# Patient Record
Sex: FEMALE | Race: BLACK OR AFRICAN AMERICAN | Employment: OTHER | URBAN - METROPOLITAN AREA
[De-identification: names, ages, dates, MRNs, and addresses within clinical notes are randomized per-mention and may not be internally consistent; named-entity substitution may affect disease eponyms.]

---

## 2017-01-20 ENCOUNTER — HOSPITAL ENCOUNTER (EMERGENCY)
Age: 82
Discharge: HOME | End: 2017-01-20
Attending: EMERGENCY MEDICINE
Payer: MEDICARE

## 2017-01-20 ENCOUNTER — APPOINTMENT (OUTPATIENT)
Dept: GENERAL RADIOLOGY | Age: 82
End: 2017-01-20
Attending: EMERGENCY MEDICINE
Payer: MEDICARE

## 2017-01-20 VITALS
HEART RATE: 82 BPM | TEMPERATURE: 97.8 F | SYSTOLIC BLOOD PRESSURE: 156 MMHG | BODY MASS INDEX: 21.53 KG/M2 | OXYGEN SATURATION: 100 % | HEIGHT: 62 IN | WEIGHT: 117 LBS | RESPIRATION RATE: 24 BRPM | DIASTOLIC BLOOD PRESSURE: 96 MMHG

## 2017-01-20 DIAGNOSIS — R06.00 DYSPNEA, UNSPECIFIED TYPE: Primary | ICD-10-CM

## 2017-01-20 DIAGNOSIS — E87.6 HYPOKALEMIA: ICD-10-CM

## 2017-01-20 LAB
ALBUMIN SERPL BCP-MCNC: 3.5 G/DL (ref 3.4–5)
ALBUMIN/GLOB SERPL: 0.8 {RATIO} (ref 0.8–1.7)
ALP SERPL-CCNC: 67 U/L (ref 45–117)
ALT SERPL-CCNC: 22 U/L (ref 13–56)
ANION GAP BLD CALC-SCNC: 5 MMOL/L (ref 3–18)
APPEARANCE UR: CLEAR
AST SERPL W P-5'-P-CCNC: 26 U/L (ref 15–37)
ATRIAL RATE: 97 BPM
BACTERIA URNS QL MICRO: ABNORMAL /HPF
BASOPHILS # BLD AUTO: 0 K/UL (ref 0–0.06)
BASOPHILS # BLD: 0 % (ref 0–2)
BILIRUB SERPL-MCNC: 1.5 MG/DL (ref 0.2–1)
BILIRUB UR QL: NEGATIVE
BNP SERPL-MCNC: 453 PG/ML (ref 0–1800)
BUN SERPL-MCNC: 13 MG/DL (ref 7–18)
BUN/CREAT SERPL: 14 (ref 12–20)
CALCIUM SERPL-MCNC: 8.8 MG/DL (ref 8.5–10.1)
CALCULATED P AXIS, ECG09: 80 DEGREES
CALCULATED R AXIS, ECG10: 54 DEGREES
CALCULATED T AXIS, ECG11: 12 DEGREES
CHLORIDE SERPL-SCNC: 98 MMOL/L (ref 100–108)
CK MB CFR SERPL CALC: 1.6 % (ref 0–4)
CK MB SERPL-MCNC: 2.6 NG/ML (ref 0.5–3.6)
CK SERPL-CCNC: 161 U/L (ref 26–192)
CO2 SERPL-SCNC: 32 MMOL/L (ref 21–32)
COLOR UR: YELLOW
CREAT SERPL-MCNC: 0.93 MG/DL (ref 0.6–1.3)
DIAGNOSIS, 93000: NORMAL
DIFFERENTIAL METHOD BLD: ABNORMAL
EOSINOPHIL # BLD: 0 K/UL (ref 0–0.4)
EOSINOPHIL NFR BLD: 0 % (ref 0–5)
EPITH CASTS URNS QL MICRO: ABNORMAL /LPF (ref 0–5)
ERYTHROCYTE [DISTWIDTH] IN BLOOD BY AUTOMATED COUNT: 13.9 % (ref 11.6–14.5)
GLOBULIN SER CALC-MCNC: 4.2 G/DL (ref 2–4)
GLUCOSE SERPL-MCNC: 84 MG/DL (ref 74–99)
GLUCOSE UR STRIP.AUTO-MCNC: NEGATIVE MG/DL
HCT VFR BLD AUTO: 41.7 % (ref 35–45)
HGB BLD-MCNC: 14.2 G/DL (ref 12–16)
HGB UR QL STRIP: ABNORMAL
KETONES UR QL STRIP.AUTO: NEGATIVE MG/DL
LEUKOCYTE ESTERASE UR QL STRIP.AUTO: NEGATIVE
LYMPHOCYTES # BLD AUTO: 28 % (ref 21–52)
LYMPHOCYTES # BLD: 1.3 K/UL (ref 0.9–3.6)
MCH RBC QN AUTO: 28.3 PG (ref 24–34)
MCHC RBC AUTO-ENTMCNC: 34.1 G/DL (ref 31–37)
MCV RBC AUTO: 83.2 FL (ref 74–97)
MONOCYTES # BLD: 0.5 K/UL (ref 0.05–1.2)
MONOCYTES NFR BLD AUTO: 12 % (ref 3–10)
NEUTS SEG # BLD: 2.7 K/UL (ref 1.8–8)
NEUTS SEG NFR BLD AUTO: 60 % (ref 40–73)
NITRITE UR QL STRIP.AUTO: NEGATIVE
P-R INTERVAL, ECG05: 204 MS
PH UR STRIP: 7.5 [PH] (ref 5–8)
PLATELET # BLD AUTO: 303 K/UL (ref 135–420)
PMV BLD AUTO: 8.4 FL (ref 9.2–11.8)
POTASSIUM SERPL-SCNC: 3.3 MMOL/L (ref 3.5–5.5)
PROT SERPL-MCNC: 7.7 G/DL (ref 6.4–8.2)
PROT UR STRIP-MCNC: NEGATIVE MG/DL
Q-T INTERVAL, ECG07: 358 MS
QRS DURATION, ECG06: 72 MS
QTC CALCULATION (BEZET), ECG08: 454 MS
RBC # BLD AUTO: 5.01 M/UL (ref 4.2–5.3)
RBC #/AREA URNS HPF: ABNORMAL /HPF (ref 0–5)
SODIUM SERPL-SCNC: 135 MMOL/L (ref 136–145)
SP GR UR REFRACTOMETRY: 1.01 (ref 1–1.03)
TROPONIN I SERPL-MCNC: <0.02 NG/ML (ref 0–0.06)
UROBILINOGEN UR QL STRIP.AUTO: 1 EU/DL (ref 0.2–1)
VENTRICULAR RATE, ECG03: 97 BPM
WBC # BLD AUTO: 4.6 K/UL (ref 4.6–13.2)
WBC URNS QL MICRO: ABNORMAL /HPF (ref 0–5)

## 2017-01-20 PROCEDURE — 80053 COMPREHEN METABOLIC PANEL: CPT | Performed by: EMERGENCY MEDICINE

## 2017-01-20 PROCEDURE — 71010 XR CHEST PORT: CPT

## 2017-01-20 PROCEDURE — 81001 URINALYSIS AUTO W/SCOPE: CPT | Performed by: EMERGENCY MEDICINE

## 2017-01-20 PROCEDURE — 82550 ASSAY OF CK (CPK): CPT | Performed by: EMERGENCY MEDICINE

## 2017-01-20 PROCEDURE — 83880 ASSAY OF NATRIURETIC PEPTIDE: CPT | Performed by: EMERGENCY MEDICINE

## 2017-01-20 PROCEDURE — 74011250637 HC RX REV CODE- 250/637: Performed by: EMERGENCY MEDICINE

## 2017-01-20 PROCEDURE — 85025 COMPLETE CBC W/AUTO DIFF WBC: CPT | Performed by: EMERGENCY MEDICINE

## 2017-01-20 PROCEDURE — 93005 ELECTROCARDIOGRAM TRACING: CPT

## 2017-01-20 PROCEDURE — 99284 EMERGENCY DEPT VISIT MOD MDM: CPT

## 2017-01-20 RX ORDER — POTASSIUM CHLORIDE 20 MEQ/1
40 TABLET, EXTENDED RELEASE ORAL
Status: COMPLETED | OUTPATIENT
Start: 2017-01-20 | End: 2017-01-20

## 2017-01-20 RX ORDER — FUROSEMIDE 20 MG/1
20 TABLET ORAL DAILY
COMMUNITY

## 2017-01-20 RX ORDER — ASPIRIN 325 MG
50000 TABLET, DELAYED RELEASE (ENTERIC COATED) ORAL
COMMUNITY

## 2017-01-20 RX ADMIN — POTASSIUM CHLORIDE 40 MEQ: 20 TABLET, EXTENDED RELEASE ORAL at 16:13

## 2017-01-20 NOTE — ED TRIAGE NOTES
Per sister she had picked her up to take her to the pharmacy and after she said she was taking her home pt began to c/o feeling SOB. Sister thinks it is anxiety b/c she does not like to be alone b/c she gets lonely. Pt hyperventilating in triage and encouraged numerous times to slow her breathing down. Pt then c/o left arm pain so an EKG was done and given to Dr Cristiana Hernandez. Sepsis Screening completed    (  )Patient meets SIRS criteria. (  )Patient does not meet SIRS criteria.       SIRS Criteria is achieved when two or more of the following are present   Temperature < 96.8°F (36°C) or > 100.9°F (38.3°C)   Heart Rate > 90 beats per minute   Respiratory Rate > 20 beats per minute   WBC count > 12,000 or <4,000 or > 10% bands

## 2017-01-20 NOTE — ED NOTES
I have reviewed discharge instructions with the patient. The patient verbalized understanding. Pt left ED in stable condition in wheel chair into lobby with family driving pt home.  Iv discontinued prior to pt leaving ED with cath tip intact

## 2017-01-20 NOTE — ED NOTES
Pt back in room in stable condition with no distress noted and no complaints voiced, pt being prepared for discharge home at this time

## 2017-01-20 NOTE — ED PROVIDER NOTES
HPI Comments:   1:56 PM    José Miguel Oneal is a 80 y.o. female with a PMHx of psychiatric disorder presenting to the ED C/O intermittent SOB and chest pain onset today. Associated sx include wheezing. Pt also reports she doesn't eat a lot in general. Family member reports pt constantly keeps saying, \"I need air. \" Pt reports her shortness of breath is a little better. Pt reports she feels as if \"there is some distance between her and her brain. \" While in triage, pt started hyperventilating and having arm pain, so an EKG was done. Pt reports that cool air makes her feel better. Pt saw her regular doctor 2 days ago, Dr. Shanice Winchester, for irregular breathing, and he said she was fine. Pt has a PMHx of arthritis and HTN. Pt is a non smoker and non EtOH user. Pt denies cough, fever, and any other associated signs and sx. Patient is a 80 y.o. female presenting with shortness of breath. The history is provided by the patient. No  was used. Shortness of Breath   This is a new problem. The problem occurs continuously. The problem has been resolved. Associated symptoms include wheezing and chest pain. Pertinent negatives include no fever, no headaches, no rhinorrhea, no sore throat, no cough, no vomiting, no abdominal pain and no leg swelling. Past Medical History:   Diagnosis Date    Arthritis     Hypertension     Psychiatric disorder        Past Surgical History:   Procedure Laterality Date    Pr neurological procedure unlisted           History reviewed. No pertinent family history. Social History     Social History    Marital status:      Spouse name: N/A    Number of children: N/A    Years of education: N/A     Occupational History    Not on file.      Social History Main Topics    Smoking status: Never Smoker    Smokeless tobacco: Not on file    Alcohol use No    Drug use: No    Sexual activity: Not on file     Other Topics Concern    Not on file     Social History Narrative         ALLERGIES: Lamisil [terbinafine] and Pcn [penicillins]    Review of Systems   Constitutional: Negative for appetite change, chills and fever. HENT: Negative for congestion, rhinorrhea and sore throat. Eyes: Negative for pain, discharge and visual disturbance. Respiratory: Positive for shortness of breath and wheezing. Negative for cough and chest tightness. Cardiovascular: Positive for chest pain. Negative for leg swelling. Gastrointestinal: Negative for abdominal pain, diarrhea, nausea and vomiting. Endocrine: Negative for polydipsia and polyuria. Genitourinary: Negative for difficulty urinating, dysuria, frequency, hematuria, menstrual problem, pelvic pain, urgency, vaginal bleeding and vaginal discharge. Musculoskeletal: Negative for arthralgias, back pain and myalgias. (+) Arm Pain   Skin: Negative for color change. Neurological: Negative for weakness, numbness and headaches. Hematological: Does not bruise/bleed easily. Psychiatric/Behavioral: Negative for decreased concentration. All other systems reviewed and are negative. Vitals:    01/20/17 1321   BP: 141/78   Pulse: 98   Resp: 24   Temp: 97.3 °F (36.3 °C)   SpO2: 100%   Weight: 53.1 kg (117 lb)   Height: 5' 2\" (1.575 m)            Physical Exam   Nursing note and vitals reviewed. -------------------------PHYSICAL EXAM-------------------------    Vital signs and nursing notes reviewed  Nursing note and VS were reviewed      CONSTITUTIONAL: Chronically ill appearing. Alert and oriented. No distress. Not diaphoretic. Afebrile. HEAD: Normocephalic, Atraumatic. EYES: Pupils are equal, round and reactive. Extra-ocular movements intact. Sclera anicteric. Conjunctiva not injected. ENT: Mucous membranes are moist. There is no erythema or swelling of the mucosal tissues or enlargement of the tonsillar tissue or the presence of exudates. No oral lesions or thrush. The left TM is unremarkable.  Both EAC's are without swelling or erythema. Both TM's unremarkable. Nasal mucosa pink with no discharge and the turbinates are of normal size. NECK: Normal ROM. Neck is supple. No posterior cervical paraspinal or midline tenderness. No obvious enlargement of the thyroid. No significant anterior cervical adenopathy. Trachea is midline. CARDIOVASCULAR:  Grade II ROXANA, which is her baseline. Distal pulses are 2+ and equal.    PULMONARY: Respiratory effort is normal and without the use of accessory muscles. Patient is speaking in full sentences. Clear to auscultation bilaterally. No wheezing, rales or rhonchi. CHEST WALL: Normal shape;  non tender to palpation; no crepitus    ABDOMINAL: Soft and non-distended. No tenderness. NO peritoneal signs - No rebound, guarding or rigidity. Active bowel sounds present. BACK: No thoracolumbar midline or paraspinal tenderness. Full range of motion. No CVA tenderness. MUSCULOSKELETAL: No obvious soft tissue tenderness or sites of bony tenderness or deformities; Full range of motion in all extremities. No obvious muscle tenderness. No joint inflammation. 1+ edema noted. SKIN: Skin is warm and dry. Good skin turgor. No diaphoresis, lesions,  Rashes, or petechiae. Cap Refill is Normal.    NEUROLOGICAL: Alert, awake and appropriately oriented. Normal speech. CN's are normal;  Motor - no focal weakness; no obvious sensory loss; Cerebellar function- intact; DTR's - 2+ equal    PSYCH: Appropriate affect; normal thought content; no expressed suicidal ideation. MDM  Number of Diagnoses or Management Options  Diagnosis management comments: INITIAL CLINICAL IMPRESSION and PLANS:  The patient presents with the primary complaint(s) of: sensation of SOB. The presentation, to include historical aspects and clinical findings appear to be consistent with the DX of CHF. However, other possible DX's to consider as primary, associated with, or exacerbated by include:    1. Anxiety  2. ACS  3. PNA    Considering the above, my initial management plan to evaluate and therapeutic interventions include: Obtain Lab Studies, and Obtain Radiologic studies,  As well as those noted in the orders:    Albaro Giron MD       Amount and/or Complexity of Data Reviewed  Clinical lab tests: reviewed and ordered  Tests in the radiology section of CPT®: ordered and reviewed (CXR)  Tests in the medicine section of CPT®: reviewed and ordered (EKG)  Independent visualization of images, tracings, or specimens: yes (CXR, EKG)      ED Course     Medications   potassium chloride SR (KLOR-CON 10) tablet 40 mEq (not administered)        Procedures    EKG interpretation: (Preliminary)  2:27 PM   Sinus rhythm with marked sinus arrhythmia at 97 bpm. Anterior infarct, age undetermined. Abnormal EKG. EKG read by Paul Chery MD       ED CLINICAL SUMMARY - DISCHARGE     4:00 PM   CLINICAL COURSE while in the ED:      Intervention)s) while in ED:  ACTIONS / APPROACH: Based on the presenting SUBACUTE history of shortness of breath. My initial focus was to Determine the cause and extent of the problem, Initiate Treatment as Appropriate and STABILIZE THE PATIENT'S CONDITION . Details of actions taken are noted below. SPECIFICS REGARDING APPROACH:     1. DIAGNOSTIC RESULTS:   XR CHEST PORT   Final Result   Impression:     1.  No evidence for acute cardiopulmonary process.       As read by the radiologist.         Labs Reviewed   CBC WITH AUTOMATED DIFF - Abnormal; Notable for the following:        Result Value    MPV 8.4 (*)     MONOCYTES 12 (*)     All other components within normal limits   METABOLIC PANEL, COMPREHENSIVE - Abnormal; Notable for the following:     Sodium 135 (*)     Potassium 3.3 (*)     Chloride 98 (*)     GFR est non-AA 57 (*)     Bilirubin, total 1.5 (*)     Globulin 4.2 (*)     All other components within normal limits   URINALYSIS W/ RFLX MICROSCOPIC - Abnormal; Notable for the following:     Blood TRACE (*)     All other components within normal limits   URINE MICROSCOPIC ONLY - Abnormal; Notable for the following:     Bacteria FEW (*)     All other components within normal limits   CARDIAC PANEL,(CK, CKMB & TROPONIN)   PRO-BNP           Recent Results (from the past 12 hour(s))   EKG, 12 LEAD, INITIAL    Collection Time: 01/20/17  1:27 PM   Result Value Ref Range    Ventricular Rate 97 BPM    Atrial Rate 97 BPM    P-R Interval 204 ms    QRS Duration 72 ms    Q-T Interval 358 ms    QTC Calculation (Bezet) 454 ms    Calculated P Axis 80 degrees    Calculated R Axis 54 degrees    Calculated T Axis 12 degrees    Diagnosis       Sinus rhythm with marked sinus arrhythmia  Anterior infarct (cited on or before 20-SEP-2016)  Abnormal ECG  When compared with ECG of 18-DEC-2016 13:57,  Non-specific change in ST segment in Anterior leads  Nonspecific T wave abnormality now evident in Inferior leads     URINALYSIS W/ RFLX MICROSCOPIC    Collection Time: 01/20/17  1:45 PM   Result Value Ref Range    Color YELLOW      Appearance CLEAR      Specific gravity 1.013 1.005 - 1.030      pH (UA) 7.5 5.0 - 8.0      Protein NEGATIVE  NEG mg/dL    Glucose NEGATIVE  NEG mg/dL    Ketone NEGATIVE  NEG mg/dL    Bilirubin NEGATIVE  NEG      Blood TRACE (A) NEG      Urobilinogen 1.0 0.2 - 1.0 EU/dL    Nitrites NEGATIVE  NEG      Leukocyte Esterase NEGATIVE  NEG     URINE MICROSCOPIC ONLY    Collection Time: 01/20/17  1:45 PM   Result Value Ref Range    WBC 4 to 10 0 - 5 /hpf    RBC 0 to 5 0 - 5 /hpf    Epithelial cells 1+ 0 - 5 /lpf    Bacteria FEW (A) NEG /hpf   CBC WITH AUTOMATED DIFF    Collection Time: 01/20/17  2:50 PM   Result Value Ref Range    WBC 4.6 4.6 - 13.2 K/uL    RBC 5.01 4.20 - 5.30 M/uL    HGB 14.2 12.0 - 16.0 g/dL    HCT 41.7 35.0 - 45.0 %    MCV 83.2 74.0 - 97.0 FL    MCH 28.3 24.0 - 34.0 PG    MCHC 34.1 31.0 - 37.0 g/dL    RDW 13.9 11.6 - 14.5 %    PLATELET 515 520 - 009 K/uL    MPV 8.4 (L) 9.2 - 11.8 FL    NEUTROPHILS 60 40 - 73 %    LYMPHOCYTES 28 21 - 52 %    MONOCYTES 12 (H) 3 - 10 %    EOSINOPHILS 0 0 - 5 %    BASOPHILS 0 0 - 2 %    ABS. NEUTROPHILS 2.7 1.8 - 8.0 K/UL    ABS. LYMPHOCYTES 1.3 0.9 - 3.6 K/UL    ABS. MONOCYTES 0.5 0.05 - 1.2 K/UL    ABS. EOSINOPHILS 0.0 0.0 - 0.4 K/UL    ABS. BASOPHILS 0.0 0.0 - 0.06 K/UL    DF AUTOMATED     METABOLIC PANEL, COMPREHENSIVE    Collection Time: 01/20/17  2:50 PM   Result Value Ref Range    Sodium 135 (L) 136 - 145 mmol/L    Potassium 3.3 (L) 3.5 - 5.5 mmol/L    Chloride 98 (L) 100 - 108 mmol/L    CO2 32 21 - 32 mmol/L    Anion gap 5 3.0 - 18 mmol/L    Glucose 84 74 - 99 mg/dL    BUN 13 7.0 - 18 MG/DL    Creatinine 0.93 0.6 - 1.3 MG/DL    BUN/Creatinine ratio 14 12 - 20      GFR est AA >60 >60 ml/min/1.73m2    GFR est non-AA 57 (L) >60 ml/min/1.73m2    Calcium 8.8 8.5 - 10.1 MG/DL    Bilirubin, total 1.5 (H) 0.2 - 1.0 MG/DL    ALT 22 13 - 56 U/L    AST 26 15 - 37 U/L    Alk. phosphatase 67 45 - 117 U/L    Protein, total 7.7 6.4 - 8.2 g/dL    Albumin 3.5 3.4 - 5.0 g/dL    Globulin 4.2 (H) 2.0 - 4.0 g/dL    A-G Ratio 0.8 0.8 - 1.7     CARDIAC PANEL,(CK, CKMB & TROPONIN)    Collection Time: 01/20/17  2:50 PM   Result Value Ref Range     26 - 192 U/L    CK - MB 2.6 0.5 - 3.6 ng/ml    CK-MB Index 1.6 0.0 - 4.0 %    Troponin-I, Qt. <0.02 0.00 - 0.06 NG/ML   PRO-BNP    Collection Time: 01/20/17  2:50 PM   Result Value Ref Range    NT pro- 0 - 1800 PG/ML       2. MEDICATIONS GIVEN:   Medications   potassium chloride SR (KLOR-CON 10) tablet 40 mEq (not administered)        Response to Intervention(s):   IMPROVED       Unanticipated Developments: NONE     ED COURSE - General Comment:  During the ED course I had re-evaluated the patient, answered their and /or their family's questions regarding my clinical impression, the patient's condition and plans for therapeutic interventions.  The patient's ED course was uneventful and remained stable throughout. CLINICAL IMPRESSION AND DISCUSSION:   I reviewed our electronic medical record system for any past medical records that were available that may contribute to the patients current condition, the nursing notes and vital signs from today's visit. Based on the clinical presentation, findings and results of diagnostic studies, as well as developments while in the ED,  I suspect the following: For the presentation noted above    The most likely cause or diagnosis is: dyspnea, unspecified, and hypokalemia. DISCUSSION REGARDING DIAGNOSIS: The specifics regarding my clinical impression / diagnosis are as follows: At this time there is no clinical evidence to support other pertinent diagnostic considerations such as:   N/A    Finally, other diagnostic considerations during this visit are noted below in Clinical Impression. Specific Conversations:  NONE      DISPOSITION DECISION:     DISCHARGE: I feel that we have optimized outpatient assessment and management such that Jean Claude Mejía is stable to be discharged and to continue with her care or complete any additional evaluation as appropriate at home or as an outpatient. Preparations will be made to discharge the patient. Present condition at the time of disposition: STABLE    DISCUSSION REGARDING THE DISPOSITION:         DISCHARGE NOTE:    Chalreen Mahmood  results have been reviewed with her. She has been counseled regarding her diagnosis, treatment, and plan. She verbally conveys understanding and agreement of the signs, symptoms, diagnosis, treatment and prognosis and additionally agrees to follow up as discussed. She also agrees with the care-plan and conveys that all of her questions have been answered.   I have also provided discharge instructions for her that include: educational information regarding their diagnosis and treatment, and list of reasons why they would want to return to the ED prior to their follow-up appointment, should her condition change. The patient and/or family has been provided with education for proper Emergency Department utilization. CLINICAL IMPRESSION  1. Dyspnea, unspecified type    2. Hypokalemia        PLAN:  1. D/C home  2. Patient's Medications   Start Taking    No medications on file   Continue Taking    AMLODIPINE (NORVASC) 10 MG TABLET    Take  by mouth daily. ASPIRIN (ASPIRIN) 325 MG TABLET    Take 325 mg by mouth daily. CHOLECALCIFEROL, VITAMIN D3, 50,000 UNIT CAP    Take 50,000 Units by mouth every seven (7) days. DONEPEZIL HCL (ARICEPT PO)    Take 5 mg by mouth nightly. FOLIC ACID 492 MCG TABLET    Take 400 mcg by mouth daily. FUROSEMIDE (LASIX) 20 MG TABLET    Take 20 mg by mouth daily. HYDROCHLOROTHIAZIDE (MICROZIDE) 12.5 MG CAPSULE    Take 12.5 mg by mouth daily. METHOTREXATE (RHEUMATREX) 2.5 MG TABLET    Take 2.5 mg by mouth. These Medications have changed    No medications on file   Stop Taking    MIRABEGRON ER (MYRBETRIQ) 25 MG ER TABLET    Take 25 mg by mouth daily. 3.   Follow-up Information     Follow up With Details Comments Contact Info    Cassandra Ward MD Schedule an appointment as soon as possible for a visit in 2 days For Primary Care Follow Up [address]      [facility] EMERGENCY DEPT Go to If symptoms worsen, As needed [address] [place] [id number]  [phone]        Return if sxs worsen    ATTESTATIONS:  This note is prepared by Daisy Hester, acting as Scribe for Christina Ornelas MD.    Christina Ornelas MD: The scribe's documentation has been prepared under my direction and personally reviewed by me in its entirety. I confirm that the note above accurately reflects all work, treatment, procedures, and medical decision making performed by me.

## 2017-01-20 NOTE — DISCHARGE INSTRUCTIONS

## 2017-01-20 NOTE — Clinical Note
SPECIAL DISCHARGE INSTRUCTIONS AND COMMENTS: 
 
General comments: Thank you for allowing us to provide you with excellent care today. We hope we addressed all of your concerns and needs. We strive to provide excellent quality care in the Emergency Depart ment. If you feel that you have not received excellent quality care or timely care, please ask to speak to the nurse manager. Please choose us in the future for your continued health care needs. Follow-up comments: The exam and treatment you rece ived in the Emergency Department were for an urgent problem that may be new for you and / or one which may be related to a worsening of a chronic or ongoing medical problem that you already had prior to this visit. In any case, today's treatment is not i ntended to be considered as complete care in all cases and thus, it is important that you follow-up with a doctor, nurse practitioner, or physicians assistant to: (1) confirm your diagnosis, (2) re-evaluation of changes in your illness and treatment, an d (3) for ongoing care. In some cases we may have contacted your doctor or a specific specialist who may be involved in your future evaluation and care but in any case, take this sheet with you when you go to your follow-up visit or refer to the infor brenda on these sheets when you are calling to arrange an appointment - it may prove helpful in making the appointment. Prescribed Medications: Unless you have been directed by the provider to change your current medicines, you should continue to poonam e them as before. If you have been prescribed medicines, please take them as directed. In some cases, these new medicines are intended to replace a medicine that you are currently taking and if so, this will be noted below.  
 
 Our expectation is that y our condition will improve by following the doctor's recommendations, however, if in the event your condition worsens or does not improve as expected, please follow-up with your PCP or if unable to reach your usual health care provider, you should return  to the Emergency Department. We are available 24 hours a day.   
 
SPECIFIC INSTRUCTIONS PROVIDED BY YOUR DOCTOR, Zonia Cox MD:

## 2017-01-20 NOTE — ED NOTES
Pt up ambulated to the bathroom X 1 person assist, steady gait. Pt states she normally ambulates with a cane.
